# Patient Record
Sex: FEMALE | Race: WHITE
[De-identification: names, ages, dates, MRNs, and addresses within clinical notes are randomized per-mention and may not be internally consistent; named-entity substitution may affect disease eponyms.]

---

## 2019-08-16 ENCOUNTER — HOSPITAL ENCOUNTER (EMERGENCY)
Dept: HOSPITAL 54 - ER | Age: 49
Discharge: HOME | End: 2019-08-16
Payer: COMMERCIAL

## 2019-08-16 VITALS — BODY MASS INDEX: 28.45 KG/M2 | HEIGHT: 66 IN | WEIGHT: 177 LBS

## 2019-08-16 VITALS — SYSTOLIC BLOOD PRESSURE: 110 MMHG | DIASTOLIC BLOOD PRESSURE: 59 MMHG

## 2019-08-16 DIAGNOSIS — K80.50: Primary | ICD-10-CM

## 2019-08-16 DIAGNOSIS — R11.2: ICD-10-CM

## 2019-08-16 LAB
ALBUMIN SERPL BCP-MCNC: 3.6 G/DL (ref 3.4–5)
ALP SERPL-CCNC: 148 U/L (ref 46–116)
ALT SERPL W P-5'-P-CCNC: 64 U/L (ref 12–78)
AST SERPL W P-5'-P-CCNC: 66 U/L (ref 15–37)
BASOPHILS # BLD AUTO: 0 /CMM (ref 0–0.2)
BASOPHILS NFR BLD AUTO: 0.2 % (ref 0–2)
BILIRUB DIRECT SERPL-MCNC: 0.1 MG/DL (ref 0–0.2)
BILIRUB SERPL-MCNC: 0.2 MG/DL (ref 0.2–1)
BUN SERPL-MCNC: 19 MG/DL (ref 7–18)
CALCIUM SERPL-MCNC: 9 MG/DL (ref 8.5–10.1)
CHLORIDE SERPL-SCNC: 105 MMOL/L (ref 98–107)
CO2 SERPL-SCNC: 25 MMOL/L (ref 21–32)
CREAT SERPL-MCNC: 0.9 MG/DL (ref 0.6–1.3)
EOSINOPHIL NFR BLD AUTO: 0.4 % (ref 0–6)
GLUCOSE SERPL-MCNC: 102 MG/DL (ref 74–106)
HCT VFR BLD AUTO: 38 % (ref 33–45)
HGB BLD-MCNC: 12.9 G/DL (ref 11.5–14.8)
LIPASE SERPL-CCNC: 160 U/L (ref 73–393)
LYMPHOCYTES NFR BLD AUTO: 1.3 /CMM (ref 0.8–4.8)
LYMPHOCYTES NFR BLD AUTO: 14.7 % (ref 20–44)
MCHC RBC AUTO-ENTMCNC: 34 G/DL (ref 31–36)
MCV RBC AUTO: 89 FL (ref 82–100)
MONOCYTES NFR BLD AUTO: 0.5 /CMM (ref 0.1–1.3)
MONOCYTES NFR BLD AUTO: 6.5 % (ref 2–12)
NEUTROPHILS # BLD AUTO: 6.7 /CMM (ref 1.8–8.9)
NEUTROPHILS NFR BLD AUTO: 78.2 % (ref 43–81)
PLATELET # BLD AUTO: 233 /CMM (ref 150–450)
POTASSIUM SERPL-SCNC: 3.9 MMOL/L (ref 3.5–5.1)
PROT SERPL-MCNC: 6.9 G/DL (ref 6.4–8.2)
RBC # BLD AUTO: 4.24 MIL/UL (ref 4–5.2)
SODIUM SERPL-SCNC: 142 MMOL/L (ref 136–145)
WBC NRBC COR # BLD AUTO: 8.5 K/UL (ref 4.3–11)

## 2019-08-16 PROCEDURE — 76705 ECHO EXAM OF ABDOMEN: CPT

## 2019-08-16 PROCEDURE — 36415 COLL VENOUS BLD VENIPUNCTURE: CPT

## 2019-08-16 PROCEDURE — 80048 BASIC METABOLIC PNL TOTAL CA: CPT

## 2019-08-16 PROCEDURE — 80076 HEPATIC FUNCTION PANEL: CPT

## 2019-08-16 PROCEDURE — 83690 ASSAY OF LIPASE: CPT

## 2019-08-16 PROCEDURE — 85025 COMPLETE CBC W/AUTO DIFF WBC: CPT

## 2019-08-16 PROCEDURE — 99284 EMERGENCY DEPT VISIT MOD MDM: CPT

## 2019-08-16 PROCEDURE — 96374 THER/PROPH/DIAG INJ IV PUSH: CPT

## 2019-08-16 RX ADMIN — ONDANSETRON ONE MG: 4 TABLET, ORALLY DISINTEGRATING ORAL at 16:45

## 2019-08-16 RX ADMIN — ONDANSETRON ONE MG: 4 TABLET, ORALLY DISINTEGRATING ORAL at 17:30

## 2019-08-16 NOTE — NUR
Patient with c/o "Abdominal Pain started last night feel bloated and nauseous". 
no c/o sob or cp, no episode of diarrhea or vomiting. connected to monitor. 
will cont to monitor.